# Patient Record
(demographics unavailable — no encounter records)

---

## 2024-10-08 NOTE — CONSULT LETTER
[Dear  ___] : Dear  [unfilled], [Consult Letter:] : I had the pleasure of evaluating your patient, [unfilled]. [Please see my note below.] : Please see my note below. [Consult Closing:] : Thank you very much for allowing me to participate in the care of this patient.  If you have any questions, please do not hesitate to contact me. [Sincerely,] : Sincerely, [FreeTextEntry2] : Dr Cardenas [FreeTextEntry3] : Sincerely,   Anthony Fischer MD, FACP

## 2024-10-08 NOTE — ASSESSMENT
[FreeTextEntry1] : 67-year-old man with excellent BP control, stable renal function with a creatinine currently at 1.58, but in need of a CT scan with contrast later this week.  He will hydrate aggressively.  I am ordering labs to be repeated to include BMP, Cystatin C, PTH.  He will return in 4 months.

## 2024-10-08 NOTE — PHYSICAL EXAM
[General Appearance - Alert] : alert [General Appearance - In No Acute Distress] : in no acute distress [Sclera] : the sclera and conjunctiva were normal [PERRL With Normal Accommodation] : pupils were equal in size, round, and reactive to light [Outer Ear] : the ears and nose were normal in appearance [Neck Appearance] : the appearance of the neck was normal [Neck Cervical Mass (___cm)] : no neck mass was observed [Jugular Venous Distention Increased] : there was no jugular-venous distention [] : no respiratory distress [Auscultation Breath Sounds / Voice Sounds] : lungs were clear to auscultation bilaterally [Heart Rate And Rhythm] : heart rate was normal and rhythm regular [Heart Sounds] : normal S1 and S2 [Heart Sounds Gallop] : no gallops [Murmurs] : no murmurs [Heart Sounds Pericardial Friction Rub] : no pericardial rub [Deep Tendon Reflexes (DTR)] : deep tendon reflexes were 2+ and symmetric [No Focal Deficits] : no focal deficits [Oriented To Time, Place, And Person] : oriented to person, place, and time [Impaired Insight] : insight and judgment were intact [Affect] : the affect was normal

## 2024-10-08 NOTE — HISTORY OF PRESENT ILLNESS
[FreeTextEntry1] : 67-year-old man referred by Dr. Cardenas because of hypertension and CKD.  He underwent left nephrectomy 5 years ago for RCC.  His creatinine was 1.9 but has drifted steadily downward to 1.7 and is now stable at about 1.55.  His GFR is about 50.  He does have prediabetes but A1c has been okay at 6.0.  He has exhibited microalbuminuria and is currently on optimized dose of losartan 100 mg daily.  He may be able to benefit from SGLT2 inhibitor or Kerendia.  He also has a history of prostate cancer.  He was just found 6 months ago to have a pulmonary nodule and underwent CAT scanning with contrast then and renal function remained stable.  He needs 1 again this week, and I have reminded him to do the same thing, hydrating very aggressively for 24 hours before and for several hours after the exposure to radiocontrast.  His PTH is now 129, so we will start calcitriol 0.25 mcg daily.

## 2024-10-17 NOTE — ASSESSMENT
[FreeTextEntry1] : Data reviewed:  LDCT [facility] 10/2024 personally reviewed: no sig change in nodules, rec repeat 12 mos  PFT Dr Reese 2/5/19: severe fixed obstruction, FEV1 45%, %, DLCO 12% PFT 3/3/22: FVC 3.54L (90%), FEV1 1.22L (40%), TLC 5.57L (78%), DLCO 7.32 (27%) PFT 2/15/2024: FVC 3.66L (94%), FEV1 1.19L (40%), TLC 5.93L (83%), DLCO 8.12 (30%) 6MWT 2/20/2024: 312m, desat to 85%, titrated to 8LPM  Impression: 7mm nodule stable x 9 mos Severe COPD History of post-op PE Hx prostate cancer, hx renal cell cancer Former smoker in LDCT  Plan: Repeat LDCT one year 10/2025. He should continue triple inhaler tx. On wait list for rehab still apparently. Has declined supplemental O2 but is eligible. For some reason he is getting Covid booster and RSV shot next week but declines flu vaccine, no rationale for this that I can discern, tried discussing to no real avail. Has PCV20.

## 2024-10-17 NOTE — HISTORY OF PRESENT ILLNESS
[Former] : former [TextBox_4] : 02/11/2022: Asked to evaluate patient by Dr Cardenas for PE. He had to change pulmonologist due to insurance reasons. He has COPD/emphysema and he has done best on Trelegy but insurance won't pay, so he is on Symbicort + Incruse. He has never had an exacerbation. He's dyspneic on 2-3 blocks. Quit smoking 10 years ago from 10-15 cigs a day x 40 years. He thinks his emphysema was caused by a contrast CT in 2019. The referral says he's on indefinite Eliquis for PE. He denies ever having a PE or being on anticoagulation. Does [employer] work at the [employer] and plans to retire next year. He did not have Covid. Had J&J + J&J booster.  3/3/22: He returns for clearance for penile implant. Remains on Symbicort and Incruse. No interval exacerbations. He's had surgery before for his prostate cancer and kidney cancer. He now agrees that after his kidney surgery in 2019 he had a PE, although he adamantly denied this at our previous visit. He thinks he was treated for a week but honestly he has no idea. He denies any other history of PE. There is no history (that he knows of) of any other pulmonary complications of surgery.  2/15/23: He retired in December and got a dog. He will begin part time work in May. He had his penile implant surgery without complications. His breathing is not good, can only walk 2 blocks before he has to stop but he does ride a stationary bike every day x 20 min. He takes Symbicort bid and Incruse qd and he reports daily compliance. Transitioning to Medicare. Due for screening LDCT.  2/15/2024: PMD is Dr Cardenas. He officially retired last March 2023 due to his dyspnea. Would like to return to some part time work. His breathing is getting worse. Has not returned to smoking. He walks his dog twice a day every day about 15 blocks. He is on generic Symbicort and Incruse.  4/15/2024: Breathing is the same. On wait list for pulm rehab at [facility] [place]. On Symbicort (Breyna) and Incruse. Had short interval follow up scan.   10/17/2024: Feels clinically stable. No change. On Symbicort and Incruse. Still on wait list for rehab.

## 2025-02-18 NOTE — ASSESSMENT
[FreeTextEntry1] : Diagnosis: Prostate Cancer and RCC history  Plan: Prostate Cancer PSA from 12/2024 reviewed and interpreted independently, remains stable at 0.07 ng/mL Continue with annual PSA testing.  RCC MRI reviewed and interpreted independently, JOEL Follow up with pulmonary regarding chest nodules  RTC in 1 year for Renal US   Camilo Moon MD, FACS, FRCS  of Urology [facility] Director of Laparoscopic and Robotic Surgery [facility] Director of Urology, [facility] Professor of Urology   (Office) [phone] (Cell)  [phone] [email or web address]

## 2025-02-18 NOTE — HISTORY OF PRESENT ILLNESS
[FreeTextEntry1] : Dr. 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  MRI 2/2025 with JOEL CT chest 10/2024-no significant change in lung nodules  Imaging US, CXR in 6/2020 were negative for recurrence. Imaging US in office 2/2023: Unremarkable study. Both kidneys are normal in size, position and echogenicity without hydronephrosis, stones or solid masses visualized.  PSA 12/2024 was 0.07 ng/mL PSA 5/2020 = 0.06 PSA 11/2019 = 0.10 PSA 6/2019 = 0.17  Doing well today No urinary complaints  No gross hematuria  s/p IPP with Dr. Gifford. Pump working well, no difficulty inflting and deflating pump

## 2025-02-18 NOTE — PHYSICAL EXAM
[General Appearance - Well Developed] : well developed [General Appearance - Well Nourished] : well nourished [Normal Appearance] : normal appearance [Well Groomed] : well groomed [General Appearance - In No Acute Distress] : no acute distress [Respiration, Rhythm And Depth] : normal respiratory rhythm and effort [Exaggerated Use Of Accessory Muscles For Inspiration] : no accessory muscle use [Abdomen Soft] : soft [FreeTextEntry1] : umbilical hernia  [Normal Station and Gait] : the gait and station were normal for the patient's age [] : no rash [No Focal Deficits] : no focal deficits [Oriented To Time, Place, And Person] : oriented to person, place, and time

## 2025-06-12 NOTE — ASSESSMENT
[FreeTextEntry1] : 68-year-old man with well-controlled hypertension, relatively stable renal function with a solitary right kidney, and near resolution of microalbuminuria on optimal dose ARB plus nonsteroidal MRA/Kerendia.  He will return in 4 months and will follow-up with Dr. ARDON.  I have asked him to reduce calcitriol to Monday Wednesday Friday to avoid hypercalcemia but still maintain control of secondary hyperparathyroidism.

## 2025-06-12 NOTE — HISTORY OF PRESENT ILLNESS
[FreeTextEntry1] : 68-year-old man referred by Dr. Cardenas because of hypertension and CKD.  He underwent left nephrectomy about 6 years ago for renal cell carcinoma.  His creatinine peaked at 1.9, then stabilized at about 1.6-1.8.  He is currently at 1.82, BUN 30, GFR 46, calcium 10.6 and PTH 27 on calcitriol 0.25 mcg daily, urine microalbumin has decreased to 36 on losartan 100 mg daily and Kerendia 20 mg daily.  He feels generally well.  His BP is well-controlled and was 116/78 when he saw Dr. Phillip in April.

## 2025-07-23 NOTE — DISCUSSION/SUMMARY
[FreeTextEntry1] : stable exam elevated ROSE MARY- stable, continue risk modification Lipids stable on statin HTN stable on meds/lifestyle A1c controlled [EKG obtained to assist in diagnosis and management of assessed problem(s)] : EKG obtained to assist in diagnosis and management of assessed problem(s)